# Patient Record
(demographics unavailable — no encounter records)

---

## 2024-10-29 NOTE — HEALTH RISK ASSESSMENT
[No] : No [No falls in past year] : Patient reported no falls in the past year [Little interest or pleasure doing things] : 1) Little interest or pleasure doing things [Feeling down, depressed, or hopeless] : 2) Feeling down, depressed, or hopeless [0] : 2) Feeling down, depressed, or hopeless: Not at all (0) [Time Spent: ___ Minutes] : I spent [unfilled] minutes performing a depression screening for this patient. [Never] : Never [Audit-CScore] : 0

## 2024-10-29 NOTE — HISTORY OF PRESENT ILLNESS
[de-identified] : 47 yo [facility] F with hx asthma, obesity, GERD, premature ovarian failure,  partial colon resection and appendectomy for sigmoid diverticulitis 5/18/2022, here for CPE. Had some viral URI symptoms over the weekend and lost her voice for the past 3 days, no fever no sore throat but does have a bland cough sporadically, no sick contacts and cleans home but has no regular contact with anybody except her  and evening who is well , , no children Premature ovarian failure at age 34 when dealing with her mom's illness/multiple sclerosis and had to divide her time between her work as a  and [place] and traveling back home to [facility] to help her mother.  Father had passed when she was a child and mother passed in 2017. Takes no vitamin supplements including no vitamin D or calcium History of borderline PTH level 65 with normal calcium DEXA 2024 shows osteopenia Mammogram May 2024 normal Colonoscopy May 5, 2022 pan diverticulosis, abnormal appearing appendiceal orifice and polyp in distal descending colon.  Subsequently had partial colon resection and appendectomy for sigmoid diverticulitis May 12, 2022  HCM: DEXA-5/14/2024-osteopenia Mammofram

## 2024-10-29 NOTE — REVIEW OF SYSTEMS
[Nasal Discharge] : nasal discharge [Postnasal Drip] : postnasal drip [Shortness Of Breath] : shortness of breath [Wheezing] : wheezing [Cough] : cough [Negative] : Neurological [Earache] : no earache [Nosebleed] : no nosebleeds [Sore Throat] : no sore throat [FreeTextEntry4] : loss voice x few days,mild runny nose and bland cough from mil  drip

## 2024-10-29 NOTE — HISTORY OF PRESENT ILLNESS
[de-identified] : 49 yo [facility] F with hx asthma, obesity, GERD, premature ovarian failure,  partial colon resection and appendectomy for sigmoid diverticulitis 5/18/2022, here for CPE. Had some viral URI symptoms over the weekend and lost her voice for the past 3 days, no fever no sore throat but does have a bland cough sporadically, no sick contacts and cleans home but has no regular contact with anybody except her  and evening who is well , , no children Premature ovarian failure at age 34 when dealing with her mom's illness/multiple sclerosis and had to divide her time between her work as a  and [place] and traveling back home to [facility] to help her mother.  Father had passed when she was a child and mother passed in 2017. Takes no vitamin supplements including no vitamin D or calcium History of borderline PTH level 65 with normal calcium DEXA 2024 shows osteopenia Mammogram May 2024 normal Colonoscopy May 5, 2022 pan diverticulosis, abnormal appearing appendiceal orifice and polyp in distal descending colon.  Subsequently had partial colon resection and appendectomy for sigmoid diverticulitis May 12, 2022  HCM: DEXA-5/14/2024-osteopenia Mammofram

## 2024-10-29 NOTE — PHYSICAL EXAM
[Normal Sclera/Conjunctiva] : normal sclera/conjunctiva [Normal Outer Ear/Nose] : the outer ears and nose were normal in appearance [Normal Oropharynx] : the oropharynx was normal [Normal TMs] : both tympanic membranes were normal [No Edema] : there was no peripheral edema [Normal Appearance] : normal in appearance [No Axillary Lymphadenopathy] : no axillary lymphadenopathy [Soft] : abdomen soft [Non Tender] : non-tender [Normal Bowel Sounds] : normal bowel sounds [Normal] : no joint swelling and grossly normal strength and tone [No Skin Lesions] : no skin lesions [de-identified] : slight irritated nasal turbinates but open and no cobblestoning and normal appearing tonsils [de-identified] : Healed midline surgical scars

## 2024-10-29 NOTE — PHYSICAL EXAM
[Normal Sclera/Conjunctiva] : normal sclera/conjunctiva [Normal Outer Ear/Nose] : the outer ears and nose were normal in appearance [Normal Oropharynx] : the oropharynx was normal [Normal TMs] : both tympanic membranes were normal [No Edema] : there was no peripheral edema [Normal Appearance] : normal in appearance [No Axillary Lymphadenopathy] : no axillary lymphadenopathy [Soft] : abdomen soft [Non Tender] : non-tender [Normal Bowel Sounds] : normal bowel sounds [Normal] : no joint swelling and grossly normal strength and tone [No Skin Lesions] : no skin lesions [de-identified] : slight irritated nasal turbinates but open and no cobblestoning and normal appearing tonsils [de-identified] : Healed midline surgical scars

## 2025-02-26 NOTE — HISTORY OF PRESENT ILLNESS
[FreeTextEntry8] :  x 4 days, 7 hours, twitching of either eye beginning Nov 2024, every week, occasioanl twitching scalp and both lower legs and tremor in limb but cant be seen, no headache, anytime at [place] or work, lasting afew 5 sec and comes backforo an hour sleeps 5 hour/day Menopause -at age 27 Mom with multiple sclerosis passed at 2017, she was age 40 at [facility] ttime Coffee 1 in am only, water mostly with lemon or with apple cider no stressor, relationship with  is good, no children Admits life is stressful, but less stressful now Admits to not having a restful sleep and averages about 5 hours/day

## 2025-02-26 NOTE — REVIEW OF SYSTEMS
[Negative] : Integumentary [de-identified] : Random twitching of various parts of the body including both eyelids scalp and both lower extremity in which she can feel a tremor and the leg but cannot be seen

## 2025-02-26 NOTE — HISTORY OF PRESENT ILLNESS
[FreeTextEntry8] :  x 4 days, 7 hours, twitching of either eye beginning Nov 2024, every week, occasioanl twitching scalp and both lower legs and tremor in limb but cant be seen, no headache, anytime at [place] or work, lasting afew 5 sec and comes backforo an hour sleeps 5 hour/day Menopause -at age 27 Mom with multiple sclerosis passed at 2017, she was age 40 at [facility] ttime Coffee 1 in am only, water mostly with lemon or with apple cider no stressor, relationship with  is good, no children Admits life is stressful, but less stressful now Admits to not having a restful sleep and averages about 5 hours/day

## 2025-02-26 NOTE — REVIEW OF SYSTEMS
[Negative] : Integumentary [de-identified] : Random twitching of various parts of the body including both eyelids scalp and both lower extremity in which she can feel a tremor and the leg but cannot be seen